# Patient Record
Sex: FEMALE | Race: OTHER | HISPANIC OR LATINO
[De-identification: names, ages, dates, MRNs, and addresses within clinical notes are randomized per-mention and may not be internally consistent; named-entity substitution may affect disease eponyms.]

---

## 2019-12-24 ENCOUNTER — RESULT CHARGE (OUTPATIENT)
Age: 29
End: 2019-12-24

## 2019-12-24 ENCOUNTER — APPOINTMENT (OUTPATIENT)
Dept: FAMILY MEDICINE | Facility: CLINIC | Age: 29
End: 2019-12-24
Payer: MEDICAID

## 2019-12-24 VITALS
WEIGHT: 183 LBS | RESPIRATION RATE: 12 BRPM | OXYGEN SATURATION: 97 % | TEMPERATURE: 98.2 F | HEIGHT: 65 IN | BODY MASS INDEX: 30.49 KG/M2 | HEART RATE: 76 BPM

## 2019-12-24 VITALS — SYSTOLIC BLOOD PRESSURE: 118 MMHG | DIASTOLIC BLOOD PRESSURE: 75 MMHG

## 2019-12-24 DIAGNOSIS — Z87.09 PERSONAL HISTORY OF OTHER DISEASES OF THE RESPIRATORY SYSTEM: ICD-10-CM

## 2019-12-24 DIAGNOSIS — L40.9 PSORIASIS, UNSPECIFIED: ICD-10-CM

## 2019-12-24 DIAGNOSIS — R09.81 NASAL CONGESTION: ICD-10-CM

## 2019-12-24 DIAGNOSIS — F32.9 MAJOR DEPRESSIVE DISORDER, SINGLE EPISODE, UNSPECIFIED: ICD-10-CM

## 2019-12-24 LAB
FLUAV SPEC QL CULT: NORMAL
FLUBV AG SPEC QL IA: NORMAL

## 2019-12-24 PROCEDURE — 99215 OFFICE O/P EST HI 40 MIN: CPT | Mod: 25

## 2019-12-24 PROCEDURE — 87804 INFLUENZA ASSAY W/OPTIC: CPT | Mod: 59,QW

## 2019-12-24 RX ORDER — LITHIUM CARBONATE 450 MG/1
450 TABLET ORAL
Refills: 0 | Status: ACTIVE | COMMUNITY
Start: 2019-11-21

## 2019-12-24 RX ORDER — FLUTICASONE PROPIONATE 50 UG/1
50 SPRAY, METERED NASAL DAILY
Qty: 1 | Refills: 0 | Status: ACTIVE | COMMUNITY
Start: 2019-12-24 | End: 1900-01-01

## 2019-12-24 RX ORDER — CEFDINIR 300 MG/1
300 CAPSULE ORAL
Qty: 20 | Refills: 0 | Status: ACTIVE | COMMUNITY
Start: 2019-12-24 | End: 1900-01-01

## 2019-12-24 RX ORDER — CLOZAPINE 100 MG/1
100 TABLET ORAL
Refills: 0 | Status: ACTIVE | COMMUNITY
Start: 2019-11-21

## 2019-12-24 RX ORDER — BUPROPION HYDROCHLORIDE 150 MG/1
150 TABLET, EXTENDED RELEASE ORAL
Refills: 0 | Status: ACTIVE | COMMUNITY
Start: 2019-11-21

## 2019-12-24 NOTE — PHYSICAL EXAM
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 [de-identified] : unable to visualized TM r/t ear wax. +PND, erythema post pharynx without tonsillar swelling or exudate. psoriatic rash on outer ear. audible nasal congestion. sinus congestion/tenderness.

## 2019-12-24 NOTE — ASSESSMENT
[FreeTextEntry1] : rapid flu- with negative result for flu A & flu B.\par cefdinir prescribed as patient is on multiple medications that interact with current medications prescribed. cefdinir with no potential for interaction has been documented. encouraged to take probiotic to avoid s/e diarrhea. \par CHARLEE ID number [id number] translated this information to patient.

## 2019-12-24 NOTE — REVIEW OF SYSTEMS
[Hoarseness] : hoarseness [Sore Throat] : sore throat [Postnasal Drip] : postnasal drip [Negative] : Heme/Lymph [Earache] : no earache [Hearing Loss] : no hearing loss [Skin Rash] : no skin rash [Nosebleed] : no nosebleeds [Nasal Discharge] : no nasal discharge

## 2019-12-24 NOTE — HISTORY OF PRESENT ILLNESS
[FreeTextEntry1] : abnormal blood result from psych visit  [de-identified] : PT GOT BLOOD WORK ORDERED FROM HER PSYCHIATRIC DR LAST THURSDAY AND A RESULT OF WBC ELEVATED PT WAS RECOMMENDED TO FOLLOW UP WITH PCP FOR FURTHER EVAL.\par reports having had cold at the time of blood work. today patient c/o sinus congestion x4 days. denies having fevers. denies having had sick contacts for flu. denies having had flu vaccine this season. o/w denies other major accidents, injuries, illnesses, or hospitalizations this past year.

## 2019-12-24 NOTE — COUNSELING
[None] : None [Good understanding] : Patient has a good understanding of lifestyle changes and steps needed to achieve self management goal [de-identified] : infection prevention- Cover your nose/mouth with a tissue when you cough/sneeze and throw the tissue in the garbage after use. Wash your hands with soap and water often or alcohol-based hand  if not available, especially after blowing nose/using tissue. \par Avoid touching eyes, nose, mouth to prevent spread of infection.\par clean and disinfect surfaces and objects contaminated with germs. \par \par For more information you can visit: https://www.cdc.gov [de-identified] :  Alonso [id number] used for education

## 2019-12-24 NOTE — PLAN
[FreeTextEntry1] : repeat CBC w/ diff once antibiotic completed and patient is feeling well.\par Start cefdinir as prescribed for treatment of acute sinusitis. notify our office if any s/s side effects or adverse reaction. for serious/potentially life threatening reactions- go to nearest emergency department and then notify our office.\par Start fluticasone nasal spray as prescribed for s/s sinus congestion.\par OTC Tylenol as directed for headache/pain.\par OTC Claritin as directed for post nasal drip.\par OTC Xyzal HS as directed for symptom relief and sleep. THIS MEDICATION CAUSES DROWSINESS DO NOT TAKE DURING THE DAY, ONLY BEFORE BED FOR SLEEP.\par use of OTC probiotic recommended.\par warm salt water gargles as needed for symptoms relief.\par get plenty of rest and fluids.\par infection prevention education as discussed above; view counseling section.\par notify/return to office if worsening/persistent symptoms or new onset complaints/concerns, in the event of any emergency or life threatening condition, go to the nearest emergency department and then notify office. \par  CHARLEE [id number] USED FOR THIS ENTIRE VISIT, ALL PATIENT QUESTIONS/CONCERNS WERE ANSWERED, ALL PATIENT NEEDS WERE MET. patient verbalized understanding and agrees with plan of care- AS PER TRANSLATOR DESHPANDE ID number [id number].

## 2019-12-24 NOTE — HEALTH RISK ASSESSMENT
[No] : In the past 12 months have you used drugs other than those required for medical reasons? No [0] : 2) Feeling down, depressed, or hopeless: Not at all (0) [] : No [[id number]] : 0

## 2020-01-21 ENCOUNTER — APPOINTMENT (OUTPATIENT)
Dept: FAMILY MEDICINE | Facility: CLINIC | Age: 30
End: 2020-01-21
Payer: MEDICAID

## 2020-01-21 PROCEDURE — 36415 COLL VENOUS BLD VENIPUNCTURE: CPT

## 2020-01-23 LAB
BASOPHILS # BLD AUTO: 0.08 K/UL
BASOPHILS NFR BLD AUTO: 0.6 %
EOSINOPHIL # BLD AUTO: 0.23 K/UL
EOSINOPHIL NFR BLD AUTO: 1.8 %
HCT VFR BLD CALC: 38 %
HGB BLD-MCNC: 11.6 G/DL
IMM GRANULOCYTES NFR BLD AUTO: 0.5 %
LYMPHOCYTES # BLD AUTO: 3.33 K/UL
LYMPHOCYTES NFR BLD AUTO: 25.9 %
MAN DIFF?: NORMAL
MCHC RBC-ENTMCNC: 28.7 PG
MCHC RBC-ENTMCNC: 30.5 GM/DL
MCV RBC AUTO: 94.1 FL
MONOCYTES # BLD AUTO: 0.78 K/UL
MONOCYTES NFR BLD AUTO: 6.1 %
NEUTROPHILS # BLD AUTO: 8.39 K/UL
NEUTROPHILS NFR BLD AUTO: 65.1 %
PLATELET # BLD AUTO: 349 K/UL
RBC # BLD: 4.04 M/UL
RBC # FLD: 13.4 %
WBC # FLD AUTO: 12.88 K/UL

## 2020-12-21 PROBLEM — Z87.09 HISTORY OF ACUTE SINUSITIS: Status: RESOLVED | Noted: 2019-12-24 | Resolved: 2020-12-21
